# Patient Record
Sex: FEMALE | Race: WHITE | NOT HISPANIC OR LATINO | Employment: FULL TIME | URBAN - METROPOLITAN AREA
[De-identification: names, ages, dates, MRNs, and addresses within clinical notes are randomized per-mention and may not be internally consistent; named-entity substitution may affect disease eponyms.]

---

## 2021-06-07 RX ORDER — CHOLECALCIFEROL (VITAMIN D3) 25 MCG
1 TABLET ORAL DAILY
COMMUNITY

## 2021-06-07 RX ORDER — SODIUM PHOSPHATE,MONO-DIBASIC 19G-7G/118
1 ENEMA (ML) RECTAL DAILY
COMMUNITY

## 2021-06-07 RX ORDER — MULTIVIT WITH MINERALS/LUTEIN
1 TABLET ORAL DAILY
COMMUNITY

## 2021-06-08 ENCOUNTER — OFFICE VISIT (OUTPATIENT)
Dept: OBGYN CLINIC | Facility: CLINIC | Age: 51
End: 2021-06-08
Payer: COMMERCIAL

## 2021-06-08 VITALS
WEIGHT: 212.2 LBS | SYSTOLIC BLOOD PRESSURE: 140 MMHG | HEIGHT: 70 IN | DIASTOLIC BLOOD PRESSURE: 70 MMHG | BODY MASS INDEX: 30.38 KG/M2

## 2021-06-08 DIAGNOSIS — N95.1 MENOPAUSAL SWEATS: ICD-10-CM

## 2021-06-08 DIAGNOSIS — G43.829 MENSTRUAL MIGRAINE WITHOUT STATUS MIGRAINOSUS, NOT INTRACTABLE: Primary | ICD-10-CM

## 2021-06-08 PROCEDURE — 99213 OFFICE O/P EST LOW 20 MIN: CPT | Performed by: OBSTETRICS & GYNECOLOGY

## 2021-09-10 ENCOUNTER — VBI (OUTPATIENT)
Dept: ADMINISTRATIVE | Facility: OTHER | Age: 51
End: 2021-09-10

## 2021-09-10 NOTE — TELEPHONE ENCOUNTER
Upon review of the In Basket request we were able to locate, review, and update the patient chart as requested for Mammogram and Pap Smear (HPV) aka Cervical Cancer Screening  Any additional questions or concerns should be emailed to the Practice Liaisons via [email or web address] email, please do not reply via In Basket      Thank you  Emilie Simmons

## 2022-06-21 ENCOUNTER — ANNUAL EXAM (OUTPATIENT)
Dept: OBGYN CLINIC | Facility: CLINIC | Age: 52
End: 2022-06-21
Payer: COMMERCIAL

## 2022-06-21 VITALS
WEIGHT: 205 LBS | SYSTOLIC BLOOD PRESSURE: 142 MMHG | DIASTOLIC BLOOD PRESSURE: 68 MMHG | HEIGHT: 70 IN | BODY MASS INDEX: 29.35 KG/M2

## 2022-06-21 DIAGNOSIS — Z12.4 SCREENING FOR MALIGNANT NEOPLASM OF THE CERVIX: ICD-10-CM

## 2022-06-21 DIAGNOSIS — R87.810 CERVICAL HIGH RISK HUMAN PAPILLOMAVIRUS (HPV) DNA TEST POSITIVE: ICD-10-CM

## 2022-06-21 DIAGNOSIS — Z01.419 ROUTINE GYNECOLOGICAL EXAMINATION: Primary | ICD-10-CM

## 2022-06-21 DIAGNOSIS — Z80.3 FAMILY HISTORY OF BREAST CANCER IN MOTHER: ICD-10-CM

## 2022-06-21 DIAGNOSIS — R87.612 LGSIL ON PAP SMEAR OF CERVIX: ICD-10-CM

## 2022-06-21 DIAGNOSIS — Z12.31 BREAST CANCER SCREENING BY MAMMOGRAM: ICD-10-CM

## 2022-06-21 PROCEDURE — 99396 PREV VISIT EST AGE 40-64: CPT | Performed by: OBSTETRICS & GYNECOLOGY

## 2022-06-21 RX ORDER — RIZATRIPTAN BENZOATE 10 MG/1
10 TABLET ORAL AS NEEDED
COMMUNITY
Start: 2022-06-11

## 2022-06-21 NOTE — PROGRESS NOTES
[address], [address], [facility], [address] [facility]    ASSESSMENT/PLAN: Sheba Valenzuela is a 46 y o   who presents for annual gynecologic exam     Encounter for routine gynecologic examination  - Routine well woman exam completed today  - Cervical Cancer Screening: Current ASCCP Guidelines reviewed  Last Pap: 02/15/2021   Next Pap Due: today  - HPV Vaccination status: Not immunized  - Contraceptive counseling discussed  Current contraception: Rosa Batson IUD since 2021  - Breast Cancer Screening: Last Mammogram 2020, ordered  - Colorectal cancer screening was not ordered  - The following were reviewed in today's visit: breast self exam, mammography screening ordered, menopause and exercise    Additional problems addressed during this visit:  1  Family history of breast cancer in mother  -     Mammo screening bilateral w 3d & cad; Future; Expected date: 2023    2  Breast cancer screening by mammogram  -     Mammo screening bilateral w 3d & cad; Future; Expected date: 2023        CC: Annual Gynecologic Examination    HPI: Sheba Valenzuela is a 46 y o   who presents for annual gynecologic examination  HPI    The following portions of the patient's history were reviewed and updated as appropriate: She  has a past medical history of Papanicolaou smear (2021)  She  has a past surgical history that includes Mammo (historical) (Bilateral, 2020); Ovarian cyst removal (Left, ); Mouth surgery (); Mammo (historical) (Bilateral, 03/10/2021); and Colposcopy (2020)  Her family history includes Breast cancer (age of onset: 76) in her mother; Leukemia in her father; Uterine cancer in her sister  She  reports that she has never smoked  She has never used smokeless tobacco  She reports current alcohol use  She reports that she does not use drugs    Current Outpatient Medications   Medication Sig Dispense Refill    Ascorbic Acid (Vitamin C ER) 500 MG TBCR Take 1 tablet by mouth daily      Cholecalciferol (Vitamin D3) 25 MCG TABS Take 1 tablet by mouth daily      Fexofenadine-Pseudoephedrine (ALLEGRA-D 12 HOUR PO) Take 1 tablet by mouth daily      Glucosamine-Chondroitin 500-400 MG CAPS Take 1 capsule by mouth daily      Levonorgestrel (LILETTA) 19 5 MCG/DAY IUD IUD 1 each by Intrauterine route once      Multiple Vitamins-Minerals (Multivitamin Women 50+) TABS Take 1 tablet by mouth daily      rizatriptan (MAXALT) 10 mg tablet Take 10 mg by mouth if needed      Specialty Vitamins Products (MENOPAUSE RELIEF PO)        No current facility-administered medications for this visit  She is allergic to erythromycin and novocain [procaine]       Review of Systems      Objective:  /68 (BP Location: Left arm, Patient Position: Sitting, Cuff Size: Large)   Ht 5' 9 5" (1 765 m)   Wt 93 kg (205 lb)   LMP 06/02/2022 (Exact Date)   Breastfeeding No   BMI 29 84 kg/m²    Physical Exam      PE:  General Appearance: alert and oriented, in no acute distress  HEENT: PERRL, thyroid without masses or tenderness  Breast: No masses, tenderness, skin changes, nipple D/C or axillary or supraclavicular adenopathy  Abdomen: Soft, non-tender, non-distended, no masses, no rebound or guarding  Pelvic:       External genitalia: Normal appearance, no abnormal pigmentation, no lesions or masses  Normal Bartholin's and Nadine's  Urinary system: Urethral meatus normal, bladder non-tender  Vaginal: normal mucosa without prolapse or lesions  Normal-appearing physiologic discharge      Cervix: Normal-appearing, well-epithelialized, no gross lesions or masses No cervical motion tenderness  String in os      Adnexa: No adnexal masses or tenderness noted  Uterus: Normal-sized, regular contour, midline, mobile, no uterine tenderness  Extremities: Normal range of motion     Skin: normal, no rash or abnormalities  Neurologic: alert, oriented x3  Psychiatric: Appropriate affect, mood stable, cooperative with exam

## 2022-06-24 LAB
CYTOLOGIST CVX/VAG CYTO: NORMAL
DX ICD CODE: NORMAL
HPV I/H RISK 4 DNA CVX QL PROBE+SIG AMP: NEGATIVE
OTHER STN SPEC: NORMAL
PATH REPORT.FINAL DX SPEC: NORMAL
SL AMB NOTE:: NORMAL
SL AMB SPECIMEN ADEQUACY: NORMAL
SL AMB TEST METHODOLOGY: NORMAL

## 2022-11-14 ENCOUNTER — TELEPHONE (OUTPATIENT)
Dept: GASTROENTEROLOGY | Facility: AMBULARY SURGERY CENTER | Age: 52
End: 2022-11-14

## 2022-11-14 NOTE — TELEPHONE ENCOUNTER
11/14/22  Screened by: Elodia Mosley    Referring Provider     Pre- Screening:     There is no height or weight on file to calculate BMI.  Has patient been referred for a routine screening Colonoscopy? yes  Is the patient between 45-75 years old? yes      Previous Colonoscopy no   If yes:    Date:     Facility:     Reason:       SCHEDULING STAFF: If the patient is between 45yrs-49yrs, please advise patient to confirm benefits/coverage with their insurance company for a routine screening colonoscopy, some insurance carriers will only cover at 50yrs or older. If the patient is over 75years old, please schedule an office visit.     Does the patient want to see a Gastroenterologist prior to their procedure OR are they having any GI symptoms? no    Has the patient been hospitalized or had abdominal surgery in the past 6 months? no    Does the patient use supplemental oxygen? no    Does the patient take Coumadin, Lovenox, Plavix, Elliquis, Xarelto, or other blood thinning medication? no    Has the patient had a stroke, cardiac event, or stent placed in the past year? no    SCHEDULING STAFF: If patient answers NO to above questions, then schedule procedure. If patient answers YES to above questions, then schedule office appointment.     If patient is between 45yrs - 49yrs, please advise patient that we will have to confirm benefits & coverage with their insurance company for a routine screening colonoscopy.        PASSED OA

## 2022-11-15 ENCOUNTER — TELEPHONE (OUTPATIENT)
Dept: GASTROENTEROLOGY | Facility: CLINIC | Age: 52
End: 2022-11-15

## 2022-11-15 ENCOUNTER — PREP FOR PROCEDURE (OUTPATIENT)
Dept: GASTROENTEROLOGY | Facility: CLINIC | Age: 52
End: 2022-11-15

## 2022-11-15 DIAGNOSIS — Z83.71 FAMILY HISTORY OF COLONIC POLYPS: Primary | ICD-10-CM

## 2022-11-15 DIAGNOSIS — Z12.11 SCREENING FOR COLON CANCER: ICD-10-CM

## 2022-11-15 NOTE — TELEPHONE ENCOUNTER
Scheduled date of colonoscopy (as of today):12/23/2022  Physician performing colonoscopy: Dr Yessenia Díaz  Location of colonoscopy: [address] [place] [address]  Bowel prep reviewed with patient: [email or web address]  Instructions reviewed with patient by:Arabella Lloyd  Clearances: no

## 2023-01-17 ENCOUNTER — TELEPHONE (OUTPATIENT)
Dept: GASTROENTEROLOGY | Facility: CLINIC | Age: 53
End: 2023-01-17

## 2023-02-15 ENCOUNTER — TELEPHONE (OUTPATIENT)
Dept: GASTROENTEROLOGY | Facility: AMBULATORY SURGERY CENTER | Age: 53
End: 2023-02-15

## 2023-02-16 ENCOUNTER — ANESTHESIA (OUTPATIENT)
Dept: ANESTHESIOLOGY | Facility: AMBULATORY SURGERY CENTER | Age: 53
End: 2023-02-16

## 2023-02-16 ENCOUNTER — ANESTHESIA EVENT (OUTPATIENT)
Dept: ANESTHESIOLOGY | Facility: AMBULATORY SURGERY CENTER | Age: 53
End: 2023-02-16

## 2023-02-17 ENCOUNTER — ANESTHESIA EVENT (OUTPATIENT)
Dept: GASTROENTEROLOGY | Facility: AMBULATORY SURGERY CENTER | Age: 53
End: 2023-02-17

## 2023-02-17 ENCOUNTER — ANESTHESIA (OUTPATIENT)
Dept: GASTROENTEROLOGY | Facility: AMBULATORY SURGERY CENTER | Age: 53
End: 2023-02-17

## 2023-02-17 ENCOUNTER — HOSPITAL ENCOUNTER (OUTPATIENT)
Dept: GASTROENTEROLOGY | Facility: AMBULATORY SURGERY CENTER | Age: 53
Discharge: HOME | End: 2023-02-17

## 2023-02-17 VITALS
TEMPERATURE: 98.4 F | SYSTOLIC BLOOD PRESSURE: 142 MMHG | RESPIRATION RATE: 17 BRPM | HEART RATE: 82 BPM | BODY MASS INDEX: 28.7 KG/M2 | WEIGHT: 205 LBS | DIASTOLIC BLOOD PRESSURE: 80 MMHG | HEIGHT: 71 IN | OXYGEN SATURATION: 100 %

## 2023-02-17 DIAGNOSIS — Z12.11 SCREENING FOR COLON CANCER: ICD-10-CM

## 2023-02-17 DIAGNOSIS — Z83.71 FAMILY HISTORY OF COLONIC POLYPS: ICD-10-CM

## 2023-02-17 LAB
EXT PREGNANCY TEST URINE: NEGATIVE
EXT. CONTROL: NORMAL

## 2023-02-17 RX ORDER — SODIUM CHLORIDE, SODIUM LACTATE, POTASSIUM CHLORIDE, CALCIUM CHLORIDE 600; 310; 30; 20 MG/100ML; MG/100ML; MG/100ML; MG/100ML
50 INJECTION, SOLUTION INTRAVENOUS CONTINUOUS
Status: DISCONTINUED | OUTPATIENT
Start: 2023-02-17 | End: 2023-02-21 | Stop reason: HOSPADM

## 2023-02-17 RX ORDER — LIDOCAINE HYDROCHLORIDE 20 MG/ML
INJECTION, SOLUTION EPIDURAL; INFILTRATION; INTRACAUDAL; PERINEURAL AS NEEDED
Status: DISCONTINUED | OUTPATIENT
Start: 2023-02-17 | End: 2023-02-17

## 2023-02-17 RX ORDER — PROPOFOL 10 MG/ML
INJECTION, EMULSION INTRAVENOUS AS NEEDED
Status: DISCONTINUED | OUTPATIENT
Start: 2023-02-17 | End: 2023-02-17

## 2023-02-17 RX ADMIN — PROPOFOL 50 MG: 10 INJECTION, EMULSION INTRAVENOUS at 14:42

## 2023-02-17 RX ADMIN — PROPOFOL 50 MG: 10 INJECTION, EMULSION INTRAVENOUS at 14:47

## 2023-02-17 RX ADMIN — PROPOFOL 50 MG: 10 INJECTION, EMULSION INTRAVENOUS at 14:59

## 2023-02-17 RX ADMIN — PROPOFOL 50 MG: 10 INJECTION, EMULSION INTRAVENOUS at 14:46

## 2023-02-17 RX ADMIN — PROPOFOL 50 MG: 10 INJECTION, EMULSION INTRAVENOUS at 14:44

## 2023-02-17 RX ADMIN — PROPOFOL 50 MG: 10 INJECTION, EMULSION INTRAVENOUS at 14:40

## 2023-02-17 RX ADMIN — SODIUM CHLORIDE, SODIUM LACTATE, POTASSIUM CHLORIDE, CALCIUM CHLORIDE 50 ML/HR: 600; 310; 30; 20 INJECTION, SOLUTION INTRAVENOUS at 14:31

## 2023-02-17 RX ADMIN — PROPOFOL 50 MG: 10 INJECTION, EMULSION INTRAVENOUS at 14:49

## 2023-02-17 RX ADMIN — PROPOFOL 100 MG: 10 INJECTION, EMULSION INTRAVENOUS at 14:38

## 2023-02-17 RX ADMIN — PROPOFOL 50 MG: 10 INJECTION, EMULSION INTRAVENOUS at 14:56

## 2023-02-17 RX ADMIN — LIDOCAINE HYDROCHLORIDE 50 MG: 20 INJECTION, SOLUTION EPIDURAL; INFILTRATION; INTRACAUDAL; PERINEURAL at 14:38

## 2023-02-17 RX ADMIN — PROPOFOL 50 MG: 10 INJECTION, EMULSION INTRAVENOUS at 14:52

## 2023-02-17 NOTE — ANESTHESIA POSTPROCEDURE EVALUATION
Post-Op Assessment Note    CV Status:  Stable  Pain Score: 0    Pain management: adequate     Mental Status:  Sleepy   Hydration Status:  Stable   PONV Controlled:  None   Airway Patency:  Patent      Post Op Vitals Reviewed: Yes      Staff: CRNA         No notable events documented      BP   102/67   Temp      Pulse  79   Resp   12   SpO2   100

## 2023-02-17 NOTE — ANESTHESIA PREPROCEDURE EVALUATION
Procedure:  PRE-OP ONLY    Relevant Problems   CARDIO   (+) Menstrual migraine without status migrainosus, not intractable      NEURO/PSYCH   (+) Menstrual migraine without status migrainosus, not intractable             Anesthesia Plan  ASA Score- 2     Anesthesia Type- IV sedation with anesthesia with ASA Monitors  Additional Monitors:   Airway Plan:           Plan Factors-    Chart reviewed  Patient is not a current smoker  Induction- intravenous  Postoperative Plan-     Informed Consent- Anesthetic plan and risks discussed with patient  I personally reviewed this patient with the CRNA  Discussed and agreed on the Anesthesia Plan with the CRNA  Jayjay Berry

## 2023-02-17 NOTE — H&P
History and Physical - [facility]  Sherice Angulo 46 y o  female MRN: [id number]    HPI: Sherice Angulo is a 46y o  year old female who presents for colonoscopy for colorectal cancer screening  No prior colonoscopy    REVIEW OF SYSTEMS: Per the HPI, and otherwise unremarkable      Historical Information   Past Medical History:   Diagnosis Date   • Papanicolaou smear 02/11/2021     Past Surgical History:   Procedure Laterality Date   • COLPOSCOPY  01/28/2020   • MAMMO (HISTORICAL) Bilateral 01/31/2020    [facility] BI-RADS 2  benign findings Scattered glandular tissue 25% to 50 % dense breast tissue   • MAMMO (HISTORICAL) Bilateral 03/10/2021    [facility] BI-RADS 1  negative 25% to 50 % glandular dense breast tissue   • MOUTH SURGERY  2019    gum recession    • OVARIAN CYST REMOVAL Left 1997     Social History   Social History     Substance and Sexual Activity   Alcohol Use Yes    Comment: throughout the week (5-8/week)      Social History     Substance and Sexual Activity   Drug Use Never    Comment: No      Social History     Tobacco Use   Smoking Status Never   Smokeless Tobacco Never     Family History   Problem Relation Age of Onset   • Breast cancer Mother 76   • Leukemia Father    • Uterine cancer Sister        Meds/Allergies       Current Outpatient Medications:   •  Ascorbic Acid (Vitamin C ER) 500 MG TBCR  •  Cholecalciferol (Vitamin D3) 25 MCG TABS  •  Glucosamine-Chondroitin 500-400 MG CAPS  •  Levonorgestrel (LILETTA) 19 5 MCG/DAY IUD IUD  •  Multiple Vitamins-Minerals (Multivitamin Women 50+) TABS  •  rizatriptan (MAXALT) 10 mg tablet  •  Specialty Vitamins Products (MENOPAUSE RELIEF PO)  •  Fexofenadine-Pseudoephedrine (ALLEGRA-D 12 HOUR PO)  •  polyethylene glycol (GOLYTELY) 4000 mL solution    Current Facility-Administered Medications:   •  lactated ringers infusion, 50 mL/hr, Intravenous, Continuous, 50 mL/hr at 02/17/23 1431    Allergies   Allergen Reactions   • Erythromycin Nausea Only and Vomiting   • Novocain [Procaine] Other (See Comments)     Unknown, heart race- anxiety       Objective     /89   Pulse 86   Temp 98 4 °F (36 9 °C) (Temporal)   Resp 21   Ht 5' 10 5" (1 791 m)   Wt 93 kg (205 lb)   LMP 02/16/2023   SpO2 99%   BMI 29 00 kg/m²     PHYSICAL EXAM    Gen: NAD AAOx3  Head: Normocephalic, Atraumatic  CV: S1S2 RRR no m/r/g  CHEST: Clear b/l no c/r/w  ABD: soft, +BS NT/ND  EXT: no edema    ASSESSMENT/PLAN:  This is a 46y o  year old female here for colonoscopy, and she is stable and optimized for her procedure

## 2023-02-17 NOTE — ANESTHESIA PREPROCEDURE EVALUATION
Procedure:  COLONOSCOPY    Relevant Problems   CARDIO   (+) Menstrual migraine without status migrainosus, not intractable      NEURO/PSYCH   (+) Menstrual migraine without status migrainosus, not intractable        Physical Exam    Airway    Mallampati score: I  TM Distance: >3 FB  Neck ROM: full     Dental   No notable dental hx     Cardiovascular      Pulmonary      Other Findings        Anesthesia Plan  ASA Score- 2     Anesthesia Type- IV sedation with anesthesia with ASA Monitors  Additional Monitors:   Airway Plan:           Plan Factors-Exercise tolerance (METS): >4 METS  Chart reviewed  Patient is not a current smoker  Induction- intravenous  Postoperative Plan-     Informed Consent- Anesthetic plan and risks discussed with patient  I personally reviewed this patient with the CRNA  Discussed and agreed on the Anesthesia Plan with the CRNA  Nivia Cushing

## 2023-09-06 ENCOUNTER — OFFICE VISIT (OUTPATIENT)
Dept: OBGYN CLINIC | Facility: CLINIC | Age: 53
End: 2023-09-06
Payer: COMMERCIAL

## 2023-09-06 VITALS
WEIGHT: 219 LBS | BODY MASS INDEX: 31.35 KG/M2 | SYSTOLIC BLOOD PRESSURE: 126 MMHG | DIASTOLIC BLOOD PRESSURE: 86 MMHG | HEIGHT: 70 IN

## 2023-09-06 DIAGNOSIS — Z12.31 ENCOUNTER FOR SCREENING MAMMOGRAM FOR MALIGNANT NEOPLASM OF BREAST: ICD-10-CM

## 2023-09-06 DIAGNOSIS — Z01.419 ROUTINE GYNECOLOGICAL EXAMINATION: Primary | ICD-10-CM

## 2023-09-06 DIAGNOSIS — Z12.4 SCREENING FOR MALIGNANT NEOPLASM OF THE CERVIX: ICD-10-CM

## 2023-09-06 DIAGNOSIS — R87.612 LGSIL ON PAP SMEAR OF CERVIX: ICD-10-CM

## 2023-09-06 PROCEDURE — S0612 ANNUAL GYNECOLOGICAL EXAMINA: HCPCS | Performed by: OBSTETRICS & GYNECOLOGY

## 2023-09-06 NOTE — PROGRESS NOTES
[address] [place] [address], [address], [facility], [address] [facility],    ASSESSMENT/PLAN: Gurpreet Sheth is a 46 y.o.  who presents for annual gynecologic exam. Using OTC products for hot flashes with good results. Encounter for routine gynecologic examination  - Routine well woman exam completed today. - Cervical Cancer Screening: Current ASCCP Guidelines reviewed. Last Pap: 2022 . Next Pap Due: today due to history of LGSIL  - HPV Vaccination status: Not immunized  - Breast Cancer Screening: Last Mammogram 2020, ordered  - Colorectal cancer screening was not ordered. - The following were reviewed in today's visit: breast self exam, mammography screening ordered, menopause and adequate intake of calcium and vitamin D    Additional problems addressed during this visit:  1. Routine gynecological examination    2. Encounter for screening mammogram for malignant neoplasm of breast  -     Mammo screening bilateral w 3d & cad; Future        CC:  Annual Gynecologic Examination    HPI: Gurpreet Sheth is a 46 y.o.  who presents for annual gynecologic examination. HPI    The following portions of the patient's history were reviewed and updated as appropriate: She  has a past medical history of Herpes (), Migraine, and Papanicolaou smear (2021). She  has a past surgical history that includes Mammo (historical) (Bilateral, 2020); Ovarian cyst removal (Left, ); Mouth surgery (); Mammo (historical) (Bilateral, 03/10/2021); Colposcopy (2020); and Colonoscopy w/ biopsies (). Her family history includes Breast cancer in her mother; Leukemia in her father; Migraines in her maternal grandmother; Janas Sonny / Windell Pillow in her maternal grandmother; Stroke in her mother and paternal grandmother; Thyroid disease in her mother and sister; Uterine cancer in her sister. She  reports that she has never smoked.  She has never used smokeless tobacco. She reports current alcohol use of about 5.0 standard drinks of alcohol per week. She reports that she does not use drugs. Current Outpatient Medications   Medication Sig Dispense Refill   • Ascorbic Acid (Vitamin C ER) 500 MG TBCR Take 1 tablet by mouth daily     • Cholecalciferol (Vitamin D3) 25 MCG TABS Take 1 tablet by mouth daily     • Fexofenadine-Pseudoephedrine (ALLEGRA-D 12 HOUR PO) Take 1 tablet by mouth daily     • Glucosamine-Chondroitin 500-400 MG CAPS Take 1 capsule by mouth daily     • Levonorgestrel (LILETTA) 19.5 MCG/DAY IUD IUD 1 each by Intrauterine route once     • Multiple Vitamins-Minerals (Multivitamin Women 50+) TABS Take 1 tablet by mouth daily     • rizatriptan (MAXALT) 10 mg tablet Take 10 mg by mouth if needed     • Specialty Vitamins Products (MENOPAUSE RELIEF PO)        No current facility-administered medications for this visit. She is allergic to erythromycin and novocain [procaine]. .    Review of Systems      Objective:  /86   Ht 5' 10" (1.778 m)   Wt 99.3 kg (219 lb)   LMP 08/10/2023 (Approximate)   BMI 31.42 kg/m²    Physical Exam      PE:  General Appearance: alert and oriented, in no acute distress. HEENT: PERRL, thyroid without masses or tenderness  Breast: No masses, tenderness, skin changes, nipple D/C or axillary or supraclavicular adenopathy  Abdomen: Soft, non-tender, non-distended, no masses, no rebound or guarding. Pelvic:       External genitalia: Normal appearance, no abnormal pigmentation, no lesions or masses. Normal Bartholin's and Clementon's. Urinary system: Urethral meatus normal, bladder non-tender. Vaginal: normal mucosa without prolapse or lesions. Normal-appearing physiologic discharge      Cervix: Normal-appearing, well-epithelialized, no gross lesions or masses No cervical motion tenderness. String in os      Adnexa: No adnexal masses or tenderness noted.       Uterus: Normal-sized, regular contour, midline, mobile, no uterine tenderness. Extremities: Normal range of motion.    Skin: normal, no rash or abnormalities  Neurologic: alert, oriented x3  Psychiatric: Appropriate affect, mood stable, cooperative with exam.

## 2023-09-07 DIAGNOSIS — Z12.31 ENCOUNTER FOR SCREENING MAMMOGRAM FOR MALIGNANT NEOPLASM OF BREAST: ICD-10-CM

## 2023-09-09 LAB
CYTOLOGIST CVX/VAG CYTO: NORMAL
DX ICD CODE: NORMAL
HPV GENOTYPE REFLEX: NORMAL
HPV I/H RISK 4 DNA CVX QL PROBE+SIG AMP: NEGATIVE
Lab: NORMAL
OTHER STN SPEC: NORMAL
PATH REPORT.FINAL DX SPEC: NORMAL
SL AMB NOTE:: NORMAL
SL AMB SPECIMEN ADEQUACY: NORMAL
SL AMB TEST METHODOLOGY: NORMAL

## 2024-08-16 DIAGNOSIS — Z00.6 ENCOUNTER FOR EXAMINATION FOR NORMAL COMPARISON OR CONTROL IN CLINICAL RESEARCH PROGRAM: ICD-10-CM

## 2024-08-20 ENCOUNTER — APPOINTMENT (OUTPATIENT)
Dept: LAB | Facility: HOSPITAL | Age: 54
End: 2024-08-20

## 2024-08-20 DIAGNOSIS — Z00.6 ENCOUNTER FOR EXAMINATION FOR NORMAL COMPARISON OR CONTROL IN CLINICAL RESEARCH PROGRAM: ICD-10-CM

## 2024-08-20 PROCEDURE — 36415 COLL VENOUS BLD VENIPUNCTURE: CPT

## 2024-08-31 LAB
APOB+LDLR+PCSK9 GENE MUT ANL BLD/T: NOT DETECTED
BRCA1+BRCA2 DEL+DUP + FULL MUT ANL BLD/T: NOT DETECTED
MLH1+MSH2+MSH6+PMS2 GN DEL+DUP+FUL M: NOT DETECTED

## 2025-01-08 ENCOUNTER — OFFICE VISIT (OUTPATIENT)
Dept: OBGYN CLINIC | Facility: CLINIC | Age: 55
End: 2025-01-08
Payer: COMMERCIAL

## 2025-01-08 ENCOUNTER — NURSE TRIAGE (OUTPATIENT)
Age: 55
End: 2025-01-08

## 2025-01-08 VITALS
HEIGHT: 70 IN | DIASTOLIC BLOOD PRESSURE: 74 MMHG | SYSTOLIC BLOOD PRESSURE: 132 MMHG | WEIGHT: 219 LBS | BODY MASS INDEX: 31.35 KG/M2

## 2025-01-08 DIAGNOSIS — L29.2 VULVAR ITCHING: Primary | ICD-10-CM

## 2025-01-08 PROCEDURE — 99213 OFFICE O/P EST LOW 20 MIN: CPT | Performed by: OBSTETRICS & GYNECOLOGY

## 2025-01-08 RX ORDER — MELOXICAM 15 MG/1
TABLET ORAL
COMMUNITY
Start: 2025-01-07 | End: 2025-01-21

## 2025-01-08 RX ORDER — NYSTATIN AND TRIAMCINOLONE ACETONIDE 100000; 1 [USP'U]/G; MG/G
OINTMENT TOPICAL 2 TIMES DAILY
Qty: 30 G | Refills: 1 | Status: SHIPPED | OUTPATIENT
Start: 2025-01-08 | End: 2025-01-21

## 2025-01-08 NOTE — ASSESSMENT & PLAN NOTE
Appears to be irritation from friction. Discussed prevention methods. Will give rx for symptomatic relief  Orders:    nystatin-triamcinolone (MYCOLOG-II) ointment; Apply topically 2 (two) times a day

## 2025-01-08 NOTE — PROGRESS NOTES
[facility] [address], [address], [place] [address]  Assessment & Plan  Vulvar itching  Appears to be irritation from friction. Discussed prevention methods. Will give rx for symptomatic relief  Orders:    nystatin-triamcinolone (MYCOLOG-II) ointment; Apply topically 2 (two) times a day    Subjective:   Minoo Cortes is a 54 y.o.  .  CC:   Chief Complaint   Patient presents with    Personal Problem     PT states vaginal itching on Labia for about  week.  Treated self with Monistat 1 Saturday.  Little relief       HPI: see above    ROS: Negative except as noted in HPI    Patient's last menstrual period was 2024 (approximate).       She  reports being sexually active and has had partner(s) who are male. She reports using the following method of birth control/protection: I.U.D..       The following portions of the patient's history were reviewed and updated as appropriate:   Past Medical History:   Diagnosis Date    Herpes     Migraine     Papanicolaou smear 2021     Past Surgical History:   Procedure Laterality Date    COLONOSCOPY W/ BIOPSIES      return in 3 years, had a Polyp removed    COLPOSCOPY  2020    MAMMO (HISTORICAL) Bilateral 2020    [facility] BI-RADS 2. benign findings Scattered glandular tissue 25% to 50 % dense breast tissue    MAMMO (HISTORICAL) Bilateral 03/10/2021    [facility] BI-RADS 1. negative 25% to 50 % glandular dense breast tissue    MOUTH SURGERY  2019    gum recession     OVARIAN CYST REMOVAL Left      Family History   Problem Relation Age of Onset    Breast cancer Mother 68    Stroke Mother     Thyroid disease Mother         Hashimoto    Leukemia Father     Uterine cancer Sister     Thyroid disease Sister         Hypo Thyroid    Migraines Maternal Grandmother     Miscarriages / Stillbirths Maternal Grandmother     Stroke Paternal Grandmother     Colon cancer Neg Hx     Ovarian cancer Neg Hx      Social History     Socioeconomic History     Marital status: /Civil Union     Spouse name: None    Number of children: None    Years of education: None    Highest education level: None   Occupational History    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     Comment: No new partner in last year; Liletta IUD 2/18/20    Other Topics Concern    None   Social History Narrative    Colonoscopy: Never    Dexa: Never    Exercise: 2-3 times a week    Domestic violence: No    History of drug/alcohol abuse: Denies    - As per [employer]      Social Drivers of Health     Financial Resource Strain: Not on file   Food Insecurity: Not on file   Transportation Needs: Not on file   Physical Activity: Not on file   Stress: Not on file   Social Connections: Not on file   Intimate Partner Violence: Not on file   Housing Stability: Not on file     Outpatient Medications Marked as Taking for the 1/8/25 encounter (Office Visit) with Jerman Holly MD   Medication    Ascorbic Acid (Vitamin C ER) 500 MG TBCR    Cholecalciferol (Vitamin D3) 25 MCG TABS    Fexofenadine-Pseudoephedrine (ALLEGRA-D 12 HOUR PO)    Glucosamine-Chondroitin 500-400 MG CAPS    Levonorgestrel (LILETTA) 19.5 MCG/DAY IUD IUD    meloxicam (MOBIC) 15 mg tablet    Multiple Vitamins-Minerals (Multivitamin Women 50+) TABS    rizatriptan (MAXALT) 10 mg tablet    Specialty Vitamins Products (MENOPAUSE RELIEF PO)     Allergies   Allergen Reactions    Erythromycin Nausea Only and Vomiting    Novocain [Procaine] Other (See Comments)     Unknown, heart race- anxiety           Objective:  /74 (BP Location: Right  "arm, Patient Position: Sitting, Cuff Size: Large)   Ht 5' 10\" (1.778 m)   Wt 99.3 kg (219 lb)   LMP 12/16/2024 (Approximate)   BMI 31.42 kg/m²          General Appearance: alert and oriented, in no acute distress.   Abdomen: Soft, non-tender, non-distended, no masses, no rebound or guarding.  Pelvic:       External genitalia: Mild erthema, no abnormal pigmentation, no lesions or masses. Normal Bartholin's and [place]'s.      Urinary system: Urethral meatus normal, bladder non-tender.      Vaginal: normal mucosa without prolapse or lesions. Normal-appearing physiologic discharge.      Cervix: Normal-appearing, well-epithelialized, no gross lesions or masses. No cervical motion tenderness.  uterine tenderness.   Extremities: Normal range of motion.   Skin: normal, no rash or abnormalities  Neurologic: alert, oriented x3  Psychiatric: Appropriate affect, mood stable, cooperative with exam.        Jerman Holly MD  1/8/2025 3:21 PM  "

## 2025-01-08 NOTE — TELEPHONE ENCOUNTER
"Patient reporting intense vaginal itching. Thought it was a [place], no improvement- feels like itching has increased. No discharge, no odor. Scheduled appt today.   Reason for Disposition   Vaginal itching and not improved > 3 days following Care Advice    Answer Assessment - Initial Assessment Questions  1. SYMPTOM: \"What's the main symptom you're concerned about?\" (e.g., pain, itching, dryness)      Vaginal itching  2. LOCATION: \"Where is the  sx located?\" (e.g., inside/outside, left/right)      Internal and external  3. ONSET: \"When did the  sx  start?\"      Few days  4. PAIN: \"Is there any pain?\" If Yes, ask: \"How bad is it?\" (Scale: 1-10; mild, moderate, severe)      Denies  5. ITCHING: \"Is there any itching?\" If Yes, ask: \"How bad is it?\" (Scale: 1-10; mild, moderate, severe)      Yes  6. CAUSE: \"What do you think is causing the discharge?\" \"Have you had the same problem before?\" \"What happened then?\"      unsure  7. OTHER SYMPTOMS: \"Do you have any other symptoms?\" (e.g., fever, itching, vaginal bleeding, pain with urination, injury to genital area, vaginal foreign body)      Denies  8. PREGNANCY: \"Is there any chance you are pregnant?\" \"When was your last menstrual period?\"      N/A    Protocols used: Vaginal Symptoms-Adult-[place]    "

## 2025-01-21 ENCOUNTER — ANNUAL EXAM (OUTPATIENT)
Dept: OBGYN CLINIC | Facility: CLINIC | Age: 55
End: 2025-01-21
Payer: COMMERCIAL

## 2025-01-21 VITALS
HEIGHT: 70 IN | DIASTOLIC BLOOD PRESSURE: 80 MMHG | WEIGHT: 219 LBS | BODY MASS INDEX: 31.35 KG/M2 | SYSTOLIC BLOOD PRESSURE: 122 MMHG

## 2025-01-21 DIAGNOSIS — Z01.419 ROUTINE GYNECOLOGICAL EXAMINATION: Primary | ICD-10-CM

## 2025-01-21 DIAGNOSIS — Z12.31 ENCOUNTER FOR SCREENING MAMMOGRAM FOR MALIGNANT NEOPLASM OF BREAST: ICD-10-CM

## 2025-01-21 DIAGNOSIS — R87.612 LGSIL ON PAP SMEAR OF CERVIX: ICD-10-CM

## 2025-01-21 DIAGNOSIS — Z12.4 SCREENING FOR MALIGNANT NEOPLASM OF THE CERVIX: ICD-10-CM

## 2025-01-21 PROCEDURE — S0612 ANNUAL GYNECOLOGICAL EXAMINA: HCPCS | Performed by: OBSTETRICS & GYNECOLOGY

## 2025-01-21 NOTE — PROGRESS NOTES
[facility] - [address], [address], [place] [address]    ASSESSMENT/PLAN: Minoo Cortes is a 54 y.o.  who presents for annual gynecologic exam. Using OTC products for hot flashes with good results.    Encounter for routine gynecologic examination  - Routine well woman exam completed today.  - Cervical Cancer Screening: Current ASCCP Guidelines reviewed. Last Pap: 2023 . Next Pap Due: today due to history of LGSIL - pt requesting 1 more prior to return to screening protocol  - HPV Vaccination status: Not immunized  - Breast Cancer Screening: Last Mammogram 2022, ordered  - Colorectal cancer screening was not ordered.  - The following were reviewed in today's visit: breast self exam, mammography screening ordered, menopause and adequate intake of calcium and vitamin D    Additional problems addressed during this visit:  1. Routine gynecological examination  2. Encounter for screening mammogram for malignant neoplasm of breast  -     Mammo screening bilateral w 3d and cad; Future  3. Screening for malignant neoplasm of the cervix  -     IGP, Aptima HPV, Rfx 16/18,45  4. LGSIL on Pap smear of cervix  -     IGP, Aptima HPV, Rfx 16/18,45      CC:  Annual Gynecologic Examination    HPI: Minoo Cortes is a 54 y.o.  who presents for annual gynecologic examination.  HPI    The following portions of the patient's history were reviewed and updated as appropriate: She  has a past medical history of Herpes (), Migraine, and Papanicolaou smear (2021).  She  has a past surgical history that includes Mammo (historical) (Bilateral, 2020); Ovarian cyst removal (Left, ); Mouth surgery (); Mammo (historical) (Bilateral, 03/10/2021); Colposcopy (2020); and Colonoscopy w/ biopsies ().  Her family history includes Breast cancer (age of onset: 68) in her mother; Leukemia in her father; Migraines in her maternal grandmother; Miscarriages / Stillbirths in her  "maternal grandmother; Stroke in her mother and paternal grandmother; Thyroid disease in her mother and sister; Uterine cancer in her sister.  She  reports that she has never smoked. She has never used smokeless tobacco. She reports current alcohol use of about 5.0 standard drinks of alcohol per week. She reports that she does not use drugs.  Current Outpatient Medications   Medication Sig Dispense Refill    Ascorbic Acid (Vitamin C ER) 500 MG TBCR Take 1 tablet by mouth daily      Cholecalciferol (Vitamin D3) 25 MCG TABS Take 1 tablet by mouth daily      Fexofenadine-Pseudoephedrine (ALLEGRA-D 12 HOUR PO) Take 1 tablet by mouth daily      Glucosamine-Chondroitin 500-400 MG CAPS Take 1 capsule by mouth daily      Levonorgestrel (LILETTA) 19.5 MCG/DAY IUD IUD 1 each by Intrauterine route once      Multiple Vitamins-Minerals (Multivitamin Women 50+) TABS Take 1 tablet by mouth daily      rizatriptan (MAXALT) 10 mg tablet Take 10 mg by mouth if needed      Specialty Vitamins Products (MENOPAUSE RELIEF PO)       meloxicam (MOBIC) 15 mg tablet       nystatin-triamcinolone (MYCOLOG-II) ointment Apply topically 2 (two) times a day (Patient not taking: Reported on 1/21/2025) 30 g 1     No current facility-administered medications for this visit.     She is allergic to erythromycin, meloxicam, and novocain [procaine]..    Review of Systems      Objective:  /80 (BP Location: Right arm, Patient Position: Sitting, Cuff Size: Standard)   Ht 5' 10\" (1.778 m)   Wt 99.3 kg (219 lb)   LMP 01/18/2025 (Approximate)   BMI 31.42 kg/m²    Physical Exam      PE:  General Appearance: alert and oriented, in no acute distress.   HEENT: PERRL, thyroid without masses or tenderness  Breast: No masses, tenderness, skin changes, nipple D/C or axillary or supraclavicular adenopathy  Abdomen: Soft, non-tender, non-distended, no masses, no rebound or guarding.  Pelvic:       External genitalia: Normal appearance, no abnormal pigmentation, " no lesions or masses. Normal Bartholin's and Lafayette's.      Urinary system: Urethral meatus normal, bladder non-tender.      Vaginal: normal mucosa without prolapse or lesions. Normal-appearing physiologic discharge      Cervix: Normal-appearing, well-epithelialized, no gross lesions or masses No cervical motion tenderness. String in os      Adnexa: No adnexal masses or tenderness noted.      Uterus: Normal-sized, regular contour, midline, mobile, no uterine tenderness.  Extremities: Normal range of motion.   Skin: normal, no rash or abnormalities  Neurologic: alert, oriented x3  Psychiatric: Appropriate affect, mood stable, cooperative with exam.

## 2025-01-23 LAB
CYTOLOGIST CVX/VAG CYTO: NORMAL
DX ICD CODE: NORMAL
HPV GENOTYPE REFLEX: NORMAL
HPV I/H RISK 4 DNA CVX QL PROBE+SIG AMP: NEGATIVE
OTHER STN SPEC: NORMAL
PATH REPORT.FINAL DX SPEC: NORMAL
SL AMB NOTE:: NORMAL
SL AMB SPECIMEN ADEQUACY: NORMAL
SL AMB TEST METHODOLOGY: NORMAL